# Patient Record
Sex: MALE | Race: WHITE
[De-identification: names, ages, dates, MRNs, and addresses within clinical notes are randomized per-mention and may not be internally consistent; named-entity substitution may affect disease eponyms.]

---

## 2018-01-14 NOTE — EMERGENCY ROOM VISIT NOTE
ED Visit Note


First contact with patient:  14:46


Chief complaint: Right little finger laceration





HPI: This 22-year-old white male presents for evaluation of a laceration on the 

flexor surface of his right little finger. The patient was using a knife last 

night around 10 or 11 PM.  He states the knife slipped and he actually cut his 

little finger.  He did not seek treatment last night.  His friends encouraged 

him to seek treatment today.  Bleeding was controlled with pressure.  He denies 

any numbness, tingling, or loss of motion.  No other complaints.  Tetanus is 

believed to be up-to-date.  Pain is 2/10.  Right-hand dominant.


Supplemental sheet was reviewed and signed.


Previous surgeries: Right fifth metacarpal ORIF 2015


Medical history: Significant for history of asthma and GERD


Current Medications: None


Allergies: NKDA


Tetanus: Within 3 years


Family History: Unremarkable.  Parents are living.


Social History: [employer] student.  Unemployed.  No tobacco use, positive EtOH use.  

Single.





REVIEW OF SYSTEM:


HEENT:  No dizziness, visual problems, hearing loss, or tinnitus.  There is no 

difficulty swallowing and no oral lesions are present.


PULMONARY: No cough, shortness of breath, sputum production or hemoptysis.


CARDIOVASCULAR:  No chest pain, palpitations, shortness of breath or peripheral 

edema.


GASTROINTESTINAL:  No diarrhea, constipation, nausea, vomiting, or abdominal 

pain.


GENITOURINARY:  No dysuria, frequency, urgency or nocturia.


NEUROLOGIC:  No weakness, muscle tenderness, epilepsy or history of 

neurological problems.  


MUSCULOSKELETAL: No history of joint tenderness/swelling.


SKIN:  No rashes or lesions.


PSYCHIATRIC: No history of depression or mental illness.


ENDOCRINE:  No history of diabetes, thyroid disorders, or abnormal hair growth.





Physical Exam:


Vitals: Afebrile.  Reviewed and filed in patient's chart


General: Well-developed, well-nourished, young white male, in no acute 

distress. No obvious discomfort.  He is sitting on the bed. Alert and oriented.

  Smells strongly of stale alcohol.  He has a paper towel wrapped around his 

little finger.


Skin: Warm and dry with good turgor.  No rashes.  No ecchymosis or erythema.  

The patient is not  diaphoretic.  No abrasions. The patient has a 2 cm V-shaped 

laceration present on the flexor surface of his little finger.  It is at the 

PIP crease.  Minor oozing.  He appears to have hair or clothing fibers in the 

wound.  No purulent drainage.


Musculoskeletal: Patient has intact motor function to his little finger.  

Strength is 5/5 for resisted flexion and extension.  He has intact function for 

the FDS, FDP, and extensor tendons.  Intact abduction and adduction.


Neurologic: Gross sensation is intact across the little finger by soft touch.  

Capillary refill is equal to the other fingers.





Impression: Right little finger 2 cm laceration


Procedure: Informed oral consent was obtained for repair.  Little finger was 

prepped with Betadine and draped with a sterile towel.  Area was anesthetized 

using 5 mL 1% plain lidocaine in a digital block.  Finger tourniquet was 

applied.  Thorough inspection was performed.  Patient has expose the flexor 

tendon sheath but there is no visible violation.  All of the clothing fibers 

and hair particles were removed.  Nonviable skin was trimmed using iris 

scissors.  Wound was irrigated copiously using Betadine diluted with normal 

sterile saline under jet spray lavage.  Wound was closed loosely using 4-0 

nylon.  Good wound edge approximation was achieved.  Hemostasis was achieved.


Plan: Patient was educated regarding today's findings.  Conservative care 

measures were discussed.  Cleanse the wound daily with soap and water and 

reapply a small amount of bacitracin.  Ice and elevate intermittently as needed 

for discomfort.  Tylenol and ibuprofen every 6 hours as needed for pain.  Wound 

care handout was provided.  Sutures out in 12 days.  He may shower.  Avoid 

soaking or swimming for two weeks.  Return to the ER for any acute changes or 

signs of infection. potential for infection due to length of exposure was 

discussed at length.  Wound was closed loosely on purpose.


I do not suspect tendon violation at this point.


Current/Historical Medications


No Active Prescriptions or Reported Meds





Allergies


Coded Allergies:  


     No Known Allergies (Unverified , 1/14/18)





Vital Signs











  Date Time  Temp Pulse Resp B/P (MAP) Pulse Ox O2 Delivery O2 Flow Rate FiO2


 


1/14/18 14:36 36.9 86 16 145/82 96   











Departure Information


Impression





 Primary Impression:  


 Laceration of right little finger w/o foreign body w/o damage ...





Dispostion


Home / Self-Care





Condition


GOOD





Prescriptions





No Active Prescriptions or Reported Meds





Referrals


No Doctor, Assigned (PCP)





Forms


HOME CARE DOCUMENTATION FORM,                         Days to leave dressing on

:  1


   Clean wound with;:  soap and water


   Number of times/day to clean wound:  2


   Coat wound with:  antibiotic ointment


   Suture removal in how many days:  12


                           MOTRIN USE, TYLENOL USE, WOUND CARE INSTRUCTIONS, 

IMPORTANT VISIT INFORMATION





Patient Instructions


My [facility]





Additional Instructions





Cleanse the wound daily with soap and water


Avoid swimming or soaking for 2 weeks 


you may shower and wash your hands


Tylenol and Motrin every 6 hours as needed for discomfort


Sutures out in 12 days


Return to the ED for any acute changes or signs of infection

## 2018-08-11 ENCOUNTER — HOSPITAL ENCOUNTER (EMERGENCY)
Dept: HOSPITAL 45 - C.EDB | Age: 23
Discharge: HOME | End: 2018-08-11
Payer: COMMERCIAL

## 2018-08-11 VITALS — DIASTOLIC BLOOD PRESSURE: 76 MMHG | SYSTOLIC BLOOD PRESSURE: 132 MMHG | OXYGEN SATURATION: 97 % | HEART RATE: 93 BPM

## 2018-08-11 VITALS
HEIGHT: 69.02 IN | HEIGHT: 69.02 IN | BODY MASS INDEX: 22.86 KG/M2 | WEIGHT: 154.32 LBS | BODY MASS INDEX: 22.86 KG/M2 | WEIGHT: 154.32 LBS

## 2018-08-11 VITALS — TEMPERATURE: 97.88 F

## 2018-08-11 DIAGNOSIS — S92.351A: Primary | ICD-10-CM
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
## 2018-08-11 NOTE — DIAGNOSTIC IMAGING REPORT
RIGHT FOOT 3 VIEWS



CLINICAL HISTORY: Right foot pain. Fall.



FINDINGS: 3 views of the right foot are obtained. No prior studies are available

for comparison at the time of dictation. The skeletal structures are well

mineralized. There is a mildly distracted and angulated fracture through the

distal shaft of the fifth metatarsal with overlying soft tissue edema. No

additional fracture is identified. The joint spaces of the foot are maintained.



IMPRESSION: There is a mildly distracted and angulated spiral fracture through

the distal shaft of the fifth metatarsal.







Electronically signed by:  Peng Jara M.D.

8/11/2018 10:55 AM



Dictated Date/Time:  8/11/2018 10:54 AM
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
